# Patient Record
Sex: MALE | Race: WHITE | Employment: FULL TIME | URBAN - METROPOLITAN AREA
[De-identification: names, ages, dates, MRNs, and addresses within clinical notes are randomized per-mention and may not be internally consistent; named-entity substitution may affect disease eponyms.]

---

## 2017-01-04 PROBLEM — E78.5 HYPERLIPIDEMIA, UNSPECIFIED HYPERLIPIDEMIA TYPE: Status: ACTIVE | Noted: 2017-01-04

## 2017-01-16 PROBLEM — M75.122 COMPLETE TEAR OF LEFT ROTATOR CUFF: Status: ACTIVE | Noted: 2017-01-16

## 2017-02-08 PROBLEM — I51.9 SYSTOLIC DYSFUNCTION: Status: ACTIVE | Noted: 2017-02-08

## 2017-02-25 PROCEDURE — 84403 ASSAY OF TOTAL TESTOSTERONE: CPT | Performed by: UROLOGY

## 2017-04-10 PROBLEM — M75.121 COMPLETE TEAR OF RIGHT ROTATOR CUFF: Status: ACTIVE | Noted: 2017-04-10

## 2017-07-06 PROBLEM — S46.891D: Status: ACTIVE | Noted: 2017-07-06

## 2017-07-14 NOTE — H&P
[facility]    PATIENT'S NAME: Euel November   ATTENDING PHYSICIAN: Severiano Andrade M.D.    PATIENT ACCOUNT#:   [de-identified]    LOCATION:  OR   [facility]  MEDICAL RECORD #:   [id number]       YOB: 1960  ADMISSION DATE:       07/18/2017    HIS arthroscopy, acromioplasty, rotator cuff and subscapularis repair as appropriate. Limitations, risks, complications, postoperative scenarios are stressed. He is well counseled. He will go to surgery with informed consent.     Dictated By Juan Standard,

## 2017-07-18 ENCOUNTER — ANESTHESIA (OUTPATIENT)
Dept: SURGERY | Facility: HOSPITAL | Age: 57
End: 2017-07-18

## 2017-07-18 ENCOUNTER — SURGERY (OUTPATIENT)
Age: 57
End: 2017-07-18

## 2017-07-18 ENCOUNTER — ANESTHESIA EVENT (OUTPATIENT)
Dept: SURGERY | Facility: HOSPITAL | Age: 57
End: 2017-07-18

## 2017-07-18 ENCOUNTER — HOSPITAL ENCOUNTER (OUTPATIENT)
Facility: HOSPITAL | Age: 57
Setting detail: HOSPITAL OUTPATIENT SURGERY
Discharge: HOME | End: 2017-07-18
Attending: ORTHOPAEDIC SURGERY | Admitting: ORTHOPAEDIC SURGERY
Payer: COMMERCIAL

## 2017-07-18 VITALS
RESPIRATION RATE: 18 BRPM | OXYGEN SATURATION: 100 % | TEMPERATURE: 98 F | SYSTOLIC BLOOD PRESSURE: 112 MMHG | WEIGHT: 181 LBS | DIASTOLIC BLOOD PRESSURE: 73 MMHG | HEART RATE: 72 BPM | HEIGHT: 74 IN | BODY MASS INDEX: 23.23 KG/M2

## 2017-07-18 PROCEDURE — 0RNJ4ZZ RELEASE RIGHT SHOULDER JOINT, PERCUTANEOUS ENDOSCOPIC APPROACH: ICD-10-PCS | Performed by: ORTHOPAEDIC SURGERY

## 2017-07-18 PROCEDURE — 3E0T3CZ INTRODUCTION OF REGIONAL ANESTHETIC INTO PERIPHERAL NERVES AND PLEXI, PERCUTANEOUS APPROACH: ICD-10-PCS | Performed by: ANESTHESIOLOGY

## 2017-07-18 PROCEDURE — 76942 ECHO GUIDE FOR BIOPSY: CPT | Performed by: ORTHOPAEDIC SURGERY

## 2017-07-18 PROCEDURE — 0LQ14ZZ REPAIR RIGHT SHOULDER TENDON, PERCUTANEOUS ENDOSCOPIC APPROACH: ICD-10-PCS | Performed by: ORTHOPAEDIC SURGERY

## 2017-07-18 DEVICE — SYSTEM ESCP FX 19.1MM 4.75MM: Type: IMPLANTABLE DEVICE | Site: SHOULDER | Status: FUNCTIONAL

## 2017-07-18 RX ORDER — HYDROMORPHONE HYDROCHLORIDE 1 MG/ML
0.4 INJECTION, SOLUTION INTRAMUSCULAR; INTRAVENOUS; SUBCUTANEOUS EVERY 5 MIN PRN
Status: DISCONTINUED | OUTPATIENT
Start: 2017-07-18 | End: 2017-07-18

## 2017-07-18 RX ORDER — METOCLOPRAMIDE HYDROCHLORIDE 5 MG/ML
10 INJECTION INTRAMUSCULAR; INTRAVENOUS AS NEEDED
Status: DISCONTINUED | OUTPATIENT
Start: 2017-07-18 | End: 2017-07-18

## 2017-07-18 RX ORDER — HYDROCODONE BITARTRATE AND ACETAMINOPHEN 5; 325 MG/1; MG/1
1 TABLET ORAL AS NEEDED
Status: DISCONTINUED | OUTPATIENT
Start: 2017-07-18 | End: 2017-07-18

## 2017-07-18 RX ORDER — NALOXONE HYDROCHLORIDE 0.4 MG/ML
80 INJECTION, SOLUTION INTRAMUSCULAR; INTRAVENOUS; SUBCUTANEOUS AS NEEDED
Status: DISCONTINUED | OUTPATIENT
Start: 2017-07-18 | End: 2017-07-18

## 2017-07-18 RX ORDER — SODIUM CHLORIDE, SODIUM LACTATE, POTASSIUM CHLORIDE, CALCIUM CHLORIDE 600; 310; 30; 20 MG/100ML; MG/100ML; MG/100ML; MG/100ML
INJECTION, SOLUTION INTRAVENOUS CONTINUOUS
Status: DISCONTINUED | OUTPATIENT
Start: 2017-07-18 | End: 2017-07-18

## 2017-07-18 RX ORDER — HYDROCODONE BITARTRATE AND ACETAMINOPHEN 5; 325 MG/1; MG/1
2 TABLET ORAL AS NEEDED
Status: DISCONTINUED | OUTPATIENT
Start: 2017-07-18 | End: 2017-07-18

## 2017-07-18 RX ORDER — MEPERIDINE HYDROCHLORIDE 25 MG/ML
12.5 INJECTION INTRAMUSCULAR; INTRAVENOUS; SUBCUTANEOUS AS NEEDED
Status: DISCONTINUED | OUTPATIENT
Start: 2017-07-18 | End: 2017-07-18

## 2017-07-18 RX ORDER — ONDANSETRON 2 MG/ML
4 INJECTION INTRAMUSCULAR; INTRAVENOUS AS NEEDED
Status: DISCONTINUED | OUTPATIENT
Start: 2017-07-18 | End: 2017-07-18

## 2017-07-18 RX ORDER — CEFAZOLIN SODIUM 1 G/3ML
INJECTION, POWDER, FOR SOLUTION INTRAMUSCULAR; INTRAVENOUS
Status: DISCONTINUED | OUTPATIENT
Start: 2017-07-18 | End: 2017-07-18 | Stop reason: HOSPADM

## 2017-07-18 NOTE — INTERVAL H&P NOTE
Pre-op Diagnosis: ROTATOR CUFF TEAR RIGHT SHOULDER    The above referenced H&P was reviewed by Chuy Livingston MD on 7/18/2017, the patient was examined and no significant changes have occurred in the patient's condition since the H&P was performed.   I di

## 2017-07-18 NOTE — ANESTHESIA PREPROCEDURE EVALUATION
PRE-OP EVALUATION    Patient Name: Lora Anguiano    Pre-op Diagnosis: ROTATOR CUFF TEAR RIGHT SHOULDER    Procedure(s):  ARTHROSCOPIC ACROMIOPLASTY ROTATOR CUFF REPAIR AND SUBSCAPULARIS REPAIR RIGHT SHOULDER    Surgeon(s) and Role:     * Hoa Del Rosario, extracardiac: There is no significant pericardial effusion. Impressions:    - Prior study date: 11/29/2016.  - Compared to the prior study, there has been no significant interval    change.   Prepared and signed by  Jose Cali M.D.  02/15/2017     ECG hours.  Post-procedure pain management plan discussed with surgeon and patient.   Surgeon requests: regional block  Comment: The risks and benefits of regional anesthesia have been explained to the patient as indicated on the anesthesia consent form, which

## 2017-07-18 NOTE — BRIEF OP NOTE
Pre-Operative Diagnosis: ROTATOR CUFF TEAR RIGHT SHOULDER     Post-Operative Diagnosis: ROTATOR CUFF TEAR RIGHT SHOULDER     Procedure Performed:   Procedure(s):  ARTHROSCOPIC ACROMIOPLASTY ROTATOR CUFF REPAIR AND SUBSCAPULARIS REPAIR RIGHT SHOULDER

## 2017-07-20 PROBLEM — Z47.89 ORTHOPEDIC AFTERCARE: Status: ACTIVE | Noted: 2017-07-20

## 2017-07-20 NOTE — OPERATIVE REPORT
[facility]    PATIENT'S NAME: Shaye Johnson   ATTENDING PHYSICIAN: Dee Rondon M.D. OPERATING PHYSICIAN: Dee Rondon M.D.    PATIENT ACCOUNT#:   [de-identified]    LOCATION:  [address] [place] [address]  MEDICAL RECORD #:   [id number] shows evidence of tearing at its outlet. It is displaced out of its groove. There is a subscapularis tear. There is a large retracted supraspinatus tear. There is no evidence of significant arthritis. There is diffuse hyperemia.   A lateral portal is e

## 2017-09-11 PROBLEM — Z98.890 S/P ROTATOR CUFF REPAIR: Status: ACTIVE | Noted: 2017-09-11

## 2017-09-28 PROCEDURE — 84403 ASSAY OF TOTAL TESTOSTERONE: CPT | Performed by: UROLOGY

## 2017-12-14 PROBLEM — M75.122 COMPLETE TEAR OF LEFT ROTATOR CUFF: Status: RESOLVED | Noted: 2017-01-16 | Resolved: 2017-12-14

## 2017-12-14 PROBLEM — Z47.89 ORTHOPEDIC AFTERCARE: Status: RESOLVED | Noted: 2017-07-20 | Resolved: 2017-12-14

## 2017-12-14 PROBLEM — S46.891D: Status: RESOLVED | Noted: 2017-07-06 | Resolved: 2017-12-14

## 2017-12-14 PROBLEM — Z28.21 REFUSED H1N1 INFLUENZA VIRUS IMMUNIZATION: Status: ACTIVE | Noted: 2017-12-14

## 2017-12-14 PROBLEM — Z86.0101 HX OF ADENOMATOUS POLYP OF COLON: Status: ACTIVE | Noted: 2017-12-14

## 2017-12-14 PROBLEM — Z86.010 HX OF ADENOMATOUS POLYP OF COLON: Status: ACTIVE | Noted: 2017-12-14

## 2017-12-14 PROBLEM — M75.121 COMPLETE TEAR OF RIGHT ROTATOR CUFF: Status: RESOLVED | Noted: 2017-04-10 | Resolved: 2017-12-14

## 2018-02-08 PROBLEM — I49.3 PVC'S (PREMATURE VENTRICULAR CONTRACTIONS): Status: ACTIVE | Noted: 2018-02-08

## 2018-03-14 PROCEDURE — 84403 ASSAY OF TOTAL TESTOSTERONE: CPT | Performed by: UROLOGY

## 2018-05-17 PROBLEM — M19.012 ARTHROSIS OF LEFT ACROMIOCLAVICULAR JOINT: Status: ACTIVE | Noted: 2017-01-16

## 2018-05-17 RX ORDER — GARLIC EXTRACT 500 MG
1 CAPSULE ORAL AS NEEDED
COMMUNITY

## 2018-05-23 NOTE — H&P
[facility]    PATIENT'S NAME: [facility]   ATTENDING PHYSICIAN: Favian Beard M.D.    PATIENT ACCOUNT#:   [de-identified]    LOCATION:  OR   [facility]  MEDICAL RECORD #:   [id number]       YOB: 1960  ADMISSION DATE:       05/25/2018    HIS joint.  Good capillary refill. No redness, rashes. No palpable lymph nodes. NEUROLOGIC:  He is alert and oriented x3. Cranial nerves II through XII are intact.       PLAN:  The patient is scheduled for a left shoulder arthroscopic acromioplasty, distal

## 2018-05-24 ENCOUNTER — ANESTHESIA EVENT (OUTPATIENT)
Dept: SURGERY | Facility: HOSPITAL | Age: 58
End: 2018-05-24

## 2018-05-25 ENCOUNTER — ANESTHESIA (OUTPATIENT)
Dept: SURGERY | Facility: HOSPITAL | Age: 58
End: 2018-05-25

## 2018-05-25 ENCOUNTER — HOSPITAL ENCOUNTER (OUTPATIENT)
Facility: HOSPITAL | Age: 58
Setting detail: HOSPITAL OUTPATIENT SURGERY
Discharge: HOME | End: 2018-05-25
Attending: ORTHOPAEDIC SURGERY | Admitting: ORTHOPAEDIC SURGERY
Payer: COMMERCIAL

## 2018-05-25 ENCOUNTER — SURGERY (OUTPATIENT)
Age: 58
End: 2018-05-25

## 2018-05-25 VITALS
DIASTOLIC BLOOD PRESSURE: 90 MMHG | SYSTOLIC BLOOD PRESSURE: 137 MMHG | WEIGHT: 191.38 LBS | HEIGHT: 73.5 IN | OXYGEN SATURATION: 100 % | HEART RATE: 64 BPM | RESPIRATION RATE: 18 BRPM | BODY MASS INDEX: 24.82 KG/M2 | TEMPERATURE: 98 F

## 2018-05-25 DIAGNOSIS — M75.122 COMPLETE ROTATOR CUFF TEAR OF LEFT SHOULDER: ICD-10-CM

## 2018-05-25 PROCEDURE — 76942 ECHO GUIDE FOR BIOPSY: CPT | Performed by: ORTHOPAEDIC SURGERY

## 2018-05-25 PROCEDURE — 0PBB4ZZ EXCISION OF LEFT CLAVICLE, PERCUTANEOUS ENDOSCOPIC APPROACH: ICD-10-PCS | Performed by: ORTHOPAEDIC SURGERY

## 2018-05-25 PROCEDURE — 0LQ24ZZ REPAIR LEFT SHOULDER TENDON, PERCUTANEOUS ENDOSCOPIC APPROACH: ICD-10-PCS | Performed by: ORTHOPAEDIC SURGERY

## 2018-05-25 PROCEDURE — 0RNK4ZZ RELEASE LEFT SHOULDER JOINT, PERCUTANEOUS ENDOSCOPIC APPROACH: ICD-10-PCS | Performed by: ORTHOPAEDIC SURGERY

## 2018-05-25 DEVICE — SYSTEM ESCP FX 19.1MM 4.75MM: Type: IMPLANTABLE DEVICE | Site: SHOULDER | Status: FUNCTIONAL

## 2018-05-25 RX ORDER — HYDROCODONE BITARTRATE AND ACETAMINOPHEN 5; 325 MG/1; MG/1
TABLET ORAL
Qty: 60 TABLET | Refills: 0 | Status: SHIPPED | OUTPATIENT
Start: 2018-05-25 | End: 2019-02-08

## 2018-05-25 RX ORDER — CEFAZOLIN SODIUM/WATER 2 G/20 ML
2 SYRINGE (ML) INTRAVENOUS ONCE
Status: COMPLETED | OUTPATIENT
Start: 2018-05-25 | End: 2018-05-25

## 2018-05-25 RX ORDER — METOCLOPRAMIDE HYDROCHLORIDE 5 MG/ML
10 INJECTION INTRAMUSCULAR; INTRAVENOUS AS NEEDED
Status: DISCONTINUED | OUTPATIENT
Start: 2018-05-25 | End: 2018-05-25

## 2018-05-25 RX ORDER — NALOXONE HYDROCHLORIDE 0.4 MG/ML
80 INJECTION, SOLUTION INTRAMUSCULAR; INTRAVENOUS; SUBCUTANEOUS AS NEEDED
Status: DISCONTINUED | OUTPATIENT
Start: 2018-05-25 | End: 2018-05-25

## 2018-05-25 RX ORDER — ACETAMINOPHEN 500 MG
1000 TABLET ORAL AS NEEDED
COMMUNITY

## 2018-05-25 RX ORDER — DIPHENHYDRAMINE HYDROCHLORIDE 50 MG/ML
12.5 INJECTION INTRAMUSCULAR; INTRAVENOUS AS NEEDED
Status: DISCONTINUED | OUTPATIENT
Start: 2018-05-25 | End: 2018-05-25

## 2018-05-25 RX ORDER — BUPIVACAINE HYDROCHLORIDE AND EPINEPHRINE 5; 5 MG/ML; UG/ML
INJECTION, SOLUTION EPIDURAL; INTRACAUDAL; PERINEURAL AS NEEDED
Status: DISCONTINUED | OUTPATIENT
Start: 2018-05-25 | End: 2018-05-25 | Stop reason: HOSPADM

## 2018-05-25 RX ORDER — LABETALOL HYDROCHLORIDE 5 MG/ML
5 INJECTION, SOLUTION INTRAVENOUS EVERY 5 MIN PRN
Status: DISCONTINUED | OUTPATIENT
Start: 2018-05-25 | End: 2018-05-25

## 2018-05-25 RX ORDER — HYDROCODONE BITARTRATE AND ACETAMINOPHEN 5; 325 MG/1; MG/1
2 TABLET ORAL AS NEEDED
Status: DISCONTINUED | OUTPATIENT
Start: 2018-05-25 | End: 2018-05-25

## 2018-05-25 RX ORDER — ONDANSETRON 2 MG/ML
4 INJECTION INTRAMUSCULAR; INTRAVENOUS AS NEEDED
Status: DISCONTINUED | OUTPATIENT
Start: 2018-05-25 | End: 2018-05-25

## 2018-05-25 RX ORDER — SODIUM CHLORIDE, SODIUM LACTATE, POTASSIUM CHLORIDE, CALCIUM CHLORIDE 600; 310; 30; 20 MG/100ML; MG/100ML; MG/100ML; MG/100ML
INJECTION, SOLUTION INTRAVENOUS CONTINUOUS
Status: DISCONTINUED | OUTPATIENT
Start: 2018-05-25 | End: 2018-05-25

## 2018-05-25 RX ORDER — HYDROMORPHONE HYDROCHLORIDE 1 MG/ML
0.4 INJECTION, SOLUTION INTRAMUSCULAR; INTRAVENOUS; SUBCUTANEOUS EVERY 5 MIN PRN
Status: DISCONTINUED | OUTPATIENT
Start: 2018-05-25 | End: 2018-05-25

## 2018-05-25 RX ORDER — HYDROCODONE BITARTRATE AND ACETAMINOPHEN 5; 325 MG/1; MG/1
1 TABLET ORAL AS NEEDED
Status: DISCONTINUED | OUTPATIENT
Start: 2018-05-25 | End: 2018-05-25

## 2018-05-25 RX ORDER — MEPERIDINE HYDROCHLORIDE 25 MG/ML
12.5 INJECTION INTRAMUSCULAR; INTRAVENOUS; SUBCUTANEOUS AS NEEDED
Status: DISCONTINUED | OUTPATIENT
Start: 2018-05-25 | End: 2018-05-25

## 2018-05-25 NOTE — ANESTHESIA POSTPROCEDURE EVALUATION
[address] Patient Status:  Hospital Outpatient Surgery   Age/Gender 60year old male MRN [id number]   [facility] SURGERY Attending Josey Braswell MD   Hosp Day # 0 PCP Emanuel El MD       Anesthesia Post-op Note

## 2018-05-25 NOTE — INTERVAL H&P NOTE
Pre-op Diagnosis: Complete rotator cuff tear of left shoulder [M75.122]    The above referenced H&P was reviewed by Laurie Marquez MD on 5/25/2018, the patient was examined and no significant changes have occurred in the patient's condition since the H&P

## 2018-05-25 NOTE — INTERVAL H&P NOTE
Pre-op Diagnosis: Complete rotator cuff tear of left shoulder [M75.122]    The above referenced H&P was reviewed by Yanna Dickens MD on 5/25/2018, the patient was examined and no significant changes have occurred in the patient's condition since the H&P

## 2018-05-25 NOTE — ANESTHESIA PREPROCEDURE EVALUATION
PRE-OP EVALUATION    Patient Name: Víctor De Anda    Pre-op Diagnosis: Complete rotator cuff tear of left shoulder [M75.122]    Procedure(s):  ARTHROSCOPIC ACROMIOPLASTY ROTATOR CUFF REPAIR RIGHT SHOULDER, DISTAL CLAVICLE RESECTION    Surgeon(s) and Role: tablet Rfl: 0   Acidophilus/Pectin Oral Cap Take 1 capsule by mouth every other day. Disp:  Rfl:    carvedilol 6.25 MG Oral Tab Take 1 tablet (6.25 mg total) by mouth 2 (two) times daily with meals.  Disp: 180 tablet Rfl: 3   lisinopril 2.5 MG Oral Tab Take PND     Endo/Other    Negative endo/other ROS. Pulmonary    Negative pulmonary ROS.           (-) shortness of breath            Neuro/Psych    Negative neuro/psych ROS.                                 Past Surgical History:  10 auto-populate prompts). We discussed GA w/ETT and possible scratchy throat and rarely dental damage. We discussed analgesic plan to include interscalene block and PONV prophylaxis. The patient's questions were answered and consent was attained.     ALL

## 2018-05-25 NOTE — BRIEF OP NOTE
Pre-Operative Diagnosis: Complete rotator cuff tear of left shoulder [M75.122]     Post-Operative Diagnosis: Complete rotator cuff tear of left shoulder [M75.122]      Procedure Performed:   Procedure(s):  ARTHROSCOPIC ACROMIOPLASTY ROTATOR CUFF REPAIR RIG

## 2018-05-25 NOTE — OPERATIVE REPORT
[facility]    PATIENT'S NAME: Perla Cardoso   ATTENDING PHYSICIAN: Jen Kaplan M.D. OPERATING PHYSICIAN: Jen Kaplan M.D.    PATIENT ACCOUNT#:   [de-identified]    LOCATION:  [address] [place] [address]  MEDICAL RECORD #:   [id number] Operative site is identified. Procedure verified. The extremity is initialed by me in preoperative holding. Antibiotics are given. He is placed in the right lateral decubitus position. Axillary roll and beanbag support are used.   The arm is examined u The patient is transferred to the recovery room, awake, in stable condition.     Dictated By Beatriz Charles M.D.  d: 05/25/2018 11:08:34  t: 05/25/2018 15:46:32  [facility] [id number]  MA/

## 2018-06-28 PROBLEM — M25.512 ACUTE PAIN OF LEFT SHOULDER: Status: ACTIVE | Noted: 2018-06-28

## 2018-06-28 PROBLEM — M25.512 LEFT SHOULDER PAIN: Status: ACTIVE | Noted: 2018-06-28

## 2018-07-23 PROBLEM — Z98.890 S/P LEFT ROTATOR CUFF REPAIR: Status: ACTIVE | Noted: 2018-07-23

## 2018-08-03 PROCEDURE — 88305 TISSUE EXAM BY PATHOLOGIST: CPT | Performed by: INTERNAL MEDICINE

## 2019-09-04 PROBLEM — N52.02 CORPORO-VENOUS OCCLUSIVE ERECTILE DYSFUNCTION: Status: ACTIVE | Noted: 2019-09-04

## 2020-03-23 PROBLEM — M25.512 LEFT SHOULDER PAIN: Status: RESOLVED | Noted: 2018-06-28 | Resolved: 2020-03-23

## 2020-03-23 PROBLEM — M25.512 ACUTE PAIN OF LEFT SHOULDER: Status: RESOLVED | Noted: 2018-06-28 | Resolved: 2020-03-23

## 2020-09-10 PROBLEM — Z28.21 REFUSED H1N1 INFLUENZA VIRUS IMMUNIZATION: Status: RESOLVED | Noted: 2017-12-14 | Resolved: 2020-09-10

## 2021-05-26 PROBLEM — B02.9 HERPES ZOSTER WITHOUT COMPLICATION: Status: ACTIVE | Noted: 2021-05-26

## 2024-11-12 ENCOUNTER — LAB ENCOUNTER (OUTPATIENT)
Dept: LAB | Age: 64
End: 2024-11-12
Attending: ORTHOPAEDIC SURGERY
Payer: MEDICARE

## 2024-11-12 DIAGNOSIS — Z01.818 PREOP TESTING: ICD-10-CM

## 2024-11-12 LAB
ANTIBODY SCREEN: NEGATIVE
RH BLOOD TYPE: POSITIVE

## 2024-11-12 PROCEDURE — 36415 COLL VENOUS BLD VENIPUNCTURE: CPT

## 2024-11-12 PROCEDURE — 86900 BLOOD TYPING SEROLOGIC ABO: CPT

## 2024-11-12 PROCEDURE — 86901 BLOOD TYPING SEROLOGIC RH(D): CPT

## 2024-11-12 PROCEDURE — 86850 RBC ANTIBODY SCREEN: CPT
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
## 2024-11-15 ENCOUNTER — APPOINTMENT (OUTPATIENT)
Dept: GENERAL RADIOLOGY | Facility: HOSPITAL | Age: 64
End: 2024-11-15
Attending: ORTHOPAEDIC SURGERY
Payer: MEDICARE

## 2024-11-15 ENCOUNTER — ANESTHESIA (OUTPATIENT)
Dept: SURGERY | Facility: HOSPITAL | Age: 64
End: 2024-11-15
Payer: MEDICARE

## 2024-11-15 ENCOUNTER — APPOINTMENT (OUTPATIENT)
Dept: GENERAL RADIOLOGY | Facility: HOSPITAL | Age: 64
End: 2024-11-15
Attending: PHYSICIAN ASSISTANT
Payer: MEDICARE

## 2024-11-15 ENCOUNTER — HOSPITAL ENCOUNTER (INPATIENT)
Facility: HOSPITAL | Age: 64
LOS: 1 days | Discharge: HOME | End: 2024-11-16
Attending: ORTHOPAEDIC SURGERY | Admitting: ORTHOPAEDIC SURGERY
Payer: MEDICARE

## 2024-11-15 ENCOUNTER — ANESTHESIA EVENT (OUTPATIENT)
Dept: SURGERY | Facility: HOSPITAL | Age: 64
End: 2024-11-15
Payer: MEDICARE

## 2024-11-15 DIAGNOSIS — M16.11 PRIMARY OSTEOARTHRITIS OF RIGHT HIP: ICD-10-CM

## 2024-11-15 DIAGNOSIS — Z01.818 PREOP TESTING: Primary | ICD-10-CM

## 2024-11-15 LAB
DEPRECATED RDW RBC AUTO: 43.4 FL (ref 35.1–46.3)
ERYTHROCYTE [DISTWIDTH] IN BLOOD BY AUTOMATED COUNT: 12.6 % (ref 11–15)
HCT VFR BLD AUTO: 44.8 %
HGB BLD-MCNC: 15.2 G/DL
MCH RBC QN AUTO: 31.8 PG (ref 26–34)
MCHC RBC AUTO-ENTMCNC: 33.9 G/DL (ref 31–37)
MCV RBC AUTO: 93.7 FL
PLATELET # BLD AUTO: 173 10(3)UL (ref 150–450)
RBC # BLD AUTO: 4.78 X10(6)UL
RH BLOOD TYPE: POSITIVE
WBC # BLD AUTO: 7.3 X10(3) UL (ref 4–11)

## 2024-11-15 PROCEDURE — 88305 TISSUE EXAM BY PATHOLOGIST: CPT | Performed by: ORTHOPAEDIC SURGERY

## 2024-11-15 PROCEDURE — 0SR90JA REPLACEMENT OF RIGHT HIP JOINT WITH SYNTHETIC SUBSTITUTE, UNCEMENTED, OPEN APPROACH: ICD-10-PCS | Performed by: ORTHOPAEDIC SURGERY

## 2024-11-15 PROCEDURE — 76000 FLUOROSCOPY <1 HR PHYS/QHP: CPT | Performed by: ORTHOPAEDIC SURGERY

## 2024-11-15 PROCEDURE — 88311 DECALCIFY TISSUE: CPT | Performed by: ORTHOPAEDIC SURGERY

## 2024-11-15 PROCEDURE — 85027 COMPLETE CBC AUTOMATED: CPT | Performed by: PHYSICIAN ASSISTANT

## 2024-11-15 PROCEDURE — 73502 X-RAY EXAM HIP UNI 2-3 VIEWS: CPT | Performed by: PHYSICIAN ASSISTANT

## 2024-11-15 PROCEDURE — BQ101ZZ FLUOROSCOPY OF RIGHT HIP USING LOW OSMOLAR CONTRAST: ICD-10-PCS | Performed by: ORTHOPAEDIC SURGERY

## 2024-11-15 DEVICE — CANCELLOUS BONE SCREW Ø 6.5 L 35
Type: IMPLANTABLE DEVICE | Site: HIP | Status: FUNCTIONAL
Brand: MPACT EXTENSION

## 2024-11-15 DEVICE — HIP REPLACEMENT WITH CERAMIC HEAD: Type: IMPLANTABLE DEVICE | Site: HIP

## 2024-11-15 DEVICE — AMISTEM-P STD STEM #5
Type: IMPLANTABLE DEVICE | Site: HIP | Status: FUNCTIONAL
Brand: AMISTEM-P

## 2024-11-15 DEVICE — ACETABULAR SHELL Ø58 TWO HOLES
Type: IMPLANTABLE DEVICE | Site: HIP | Status: FUNCTIONAL
Brand: MPACT ACETABULAR SYSTEM

## 2024-11-15 DEVICE — FEMORAL HEAD Ø 36 SIZE L
Type: IMPLANTABLE DEVICE | Site: HIP | Status: FUNCTIONAL
Brand: MECTACER BIOLOX DELTA FEMORAL BALL HEAD

## 2024-11-15 DEVICE — FLAT PE  HC LINER Ø 36 / F
Type: IMPLANTABLE DEVICE | Site: HIP | Status: FUNCTIONAL
Brand: MPACT ACETABULAR SYSTEM

## 2024-11-15 RX ORDER — METOCLOPRAMIDE HYDROCHLORIDE 5 MG/ML
10 INJECTION INTRAMUSCULAR; INTRAVENOUS EVERY 8 HOURS PRN
Status: DISCONTINUED | OUTPATIENT
Start: 2024-11-15 | End: 2024-11-15 | Stop reason: HOSPADM

## 2024-11-15 RX ORDER — ACETAMINOPHEN 10 MG/ML
1000 INJECTION, SOLUTION INTRAVENOUS ONCE
Status: DISCONTINUED | OUTPATIENT
Start: 2024-11-15 | End: 2024-11-15 | Stop reason: HOSPADM

## 2024-11-15 RX ORDER — SODIUM CHLORIDE, SODIUM LACTATE, POTASSIUM CHLORIDE, CALCIUM CHLORIDE 600; 310; 30; 20 MG/100ML; MG/100ML; MG/100ML; MG/100ML
INJECTION, SOLUTION INTRAVENOUS CONTINUOUS
Status: DISCONTINUED | OUTPATIENT
Start: 2024-11-15 | End: 2024-11-15 | Stop reason: HOSPADM

## 2024-11-15 RX ORDER — ACETAMINOPHEN 500 MG
1000 TABLET ORAL ONCE
Status: COMPLETED | OUTPATIENT
Start: 2024-11-15 | End: 2024-11-15

## 2024-11-15 RX ORDER — HYDROCODONE BITARTRATE AND ACETAMINOPHEN 7.5; 325 MG/1; MG/1
1 TABLET ORAL EVERY 6 HOURS PRN
Status: DISCONTINUED | OUTPATIENT
Start: 2024-11-15 | End: 2024-11-16

## 2024-11-15 RX ORDER — DIPHENHYDRAMINE HCL 25 MG
25 CAPSULE ORAL EVERY 4 HOURS PRN
Status: DISCONTINUED | OUTPATIENT
Start: 2024-11-15 | End: 2024-11-16

## 2024-11-15 RX ORDER — DOCUSATE SODIUM 100 MG/1
100 CAPSULE, LIQUID FILLED ORAL 2 TIMES DAILY
Status: DISCONTINUED | OUTPATIENT
Start: 2024-11-15 | End: 2024-11-16

## 2024-11-15 RX ORDER — SODIUM PHOSPHATE, DIBASIC AND SODIUM PHOSPHATE, MONOBASIC 7; 19 G/230ML; G/230ML
1 ENEMA RECTAL ONCE AS NEEDED
Status: DISCONTINUED | OUTPATIENT
Start: 2024-11-15 | End: 2024-11-16

## 2024-11-15 RX ORDER — ATORVASTATIN CALCIUM 10 MG/1
10 TABLET, FILM COATED ORAL DAILY
Status: DISCONTINUED | OUTPATIENT
Start: 2024-11-16 | End: 2024-11-16

## 2024-11-15 RX ORDER — IPRATROPIUM BROMIDE 21 UG/1
2 SPRAY, METERED NASAL 2 TIMES DAILY
COMMUNITY
Start: 2024-08-26

## 2024-11-15 RX ORDER — ONDANSETRON 2 MG/ML
4 INJECTION INTRAMUSCULAR; INTRAVENOUS EVERY 6 HOURS PRN
Status: DISCONTINUED | OUTPATIENT
Start: 2024-11-15 | End: 2024-11-15 | Stop reason: HOSPADM

## 2024-11-15 RX ORDER — FERROUS SULFATE 325(65) MG
325 TABLET, DELAYED RELEASE (ENTERIC COATED) ORAL
Status: DISCONTINUED | OUTPATIENT
Start: 2024-11-16 | End: 2024-11-16

## 2024-11-15 RX ORDER — ONDANSETRON 2 MG/ML
4 INJECTION INTRAMUSCULAR; INTRAVENOUS EVERY 6 HOURS PRN
Status: DISCONTINUED | OUTPATIENT
Start: 2024-11-15 | End: 2024-11-16

## 2024-11-15 RX ORDER — EPHEDRINE SULFATE 50 MG/ML
INJECTION INTRAVENOUS AS NEEDED
Status: DISCONTINUED | OUTPATIENT
Start: 2024-11-15 | End: 2024-11-15 | Stop reason: SURG

## 2024-11-15 RX ORDER — BISACODYL 10 MG
10 SUPPOSITORY, RECTAL RECTAL
Status: DISCONTINUED | OUTPATIENT
Start: 2024-11-15 | End: 2024-11-16

## 2024-11-15 RX ORDER — SODIUM CHLORIDE 9 MG/ML
INJECTION, SOLUTION INTRAVENOUS CONTINUOUS
Status: DISCONTINUED | OUTPATIENT
Start: 2024-11-15 | End: 2024-11-16

## 2024-11-15 RX ORDER — PANTOPRAZOLE SODIUM 40 MG/1
40 TABLET, DELAYED RELEASE ORAL
Status: DISCONTINUED | OUTPATIENT
Start: 2024-11-16 | End: 2024-11-16

## 2024-11-15 RX ORDER — ASPIRIN 325 MG
325 TABLET, DELAYED RELEASE (ENTERIC COATED) ORAL 2 TIMES DAILY
Status: DISCONTINUED | OUTPATIENT
Start: 2024-11-15 | End: 2024-11-16

## 2024-11-15 RX ORDER — HYDROMORPHONE HYDROCHLORIDE 1 MG/ML
0.4 INJECTION, SOLUTION INTRAMUSCULAR; INTRAVENOUS; SUBCUTANEOUS EVERY 2 HOUR PRN
Status: DISCONTINUED | OUTPATIENT
Start: 2024-11-15 | End: 2024-11-16

## 2024-11-15 RX ORDER — DIPHENHYDRAMINE HYDROCHLORIDE 50 MG/ML
25 INJECTION INTRAMUSCULAR; INTRAVENOUS ONCE AS NEEDED
Status: ACTIVE | OUTPATIENT
Start: 2024-11-15 | End: 2024-11-15

## 2024-11-15 RX ORDER — TRANEXAMIC ACID 10 MG/ML
INJECTION, SOLUTION INTRAVENOUS AS NEEDED
Status: DISCONTINUED | OUTPATIENT
Start: 2024-11-15 | End: 2024-11-15 | Stop reason: SURG

## 2024-11-15 RX ORDER — BUPIVACAINE HYDROCHLORIDE 7.5 MG/ML
INJECTION, SOLUTION INTRASPINAL
Status: COMPLETED | OUTPATIENT
Start: 2024-11-15 | End: 2024-11-15

## 2024-11-15 RX ORDER — ONDANSETRON 2 MG/ML
INJECTION INTRAMUSCULAR; INTRAVENOUS AS NEEDED
Status: DISCONTINUED | OUTPATIENT
Start: 2024-11-15 | End: 2024-11-15 | Stop reason: SURG

## 2024-11-15 RX ORDER — SODIUM CHLORIDE, SODIUM LACTATE, POTASSIUM CHLORIDE, CALCIUM CHLORIDE 600; 310; 30; 20 MG/100ML; MG/100ML; MG/100ML; MG/100ML
INJECTION, SOLUTION INTRAVENOUS CONTINUOUS
Status: DISCONTINUED | OUTPATIENT
Start: 2024-11-15 | End: 2024-11-16

## 2024-11-15 RX ORDER — POLYETHYLENE GLYCOL 3350 17 G/17G
17 POWDER, FOR SOLUTION ORAL DAILY PRN
Status: DISCONTINUED | OUTPATIENT
Start: 2024-11-15 | End: 2024-11-16

## 2024-11-15 RX ORDER — SENNOSIDES 8.6 MG
17.2 TABLET ORAL NIGHTLY
Status: DISCONTINUED | OUTPATIENT
Start: 2024-11-15 | End: 2024-11-16

## 2024-11-15 RX ORDER — FAMOTIDINE 10 MG/ML
20 INJECTION, SOLUTION INTRAVENOUS 2 TIMES DAILY
Status: DISCONTINUED | OUTPATIENT
Start: 2024-11-15 | End: 2024-11-15

## 2024-11-15 RX ORDER — METOCLOPRAMIDE HYDROCHLORIDE 5 MG/ML
10 INJECTION INTRAMUSCULAR; INTRAVENOUS EVERY 8 HOURS PRN
Status: DISCONTINUED | OUTPATIENT
Start: 2024-11-15 | End: 2024-11-16

## 2024-11-15 RX ORDER — MIDAZOLAM HYDROCHLORIDE 1 MG/ML
INJECTION INTRAMUSCULAR; INTRAVENOUS AS NEEDED
Status: DISCONTINUED | OUTPATIENT
Start: 2024-11-15 | End: 2024-11-15 | Stop reason: SURG

## 2024-11-15 RX ORDER — NALOXONE HYDROCHLORIDE 0.4 MG/ML
0.08 INJECTION, SOLUTION INTRAMUSCULAR; INTRAVENOUS; SUBCUTANEOUS AS NEEDED
Status: DISCONTINUED | OUTPATIENT
Start: 2024-11-15 | End: 2024-11-15 | Stop reason: HOSPADM

## 2024-11-15 RX ORDER — FAMOTIDINE 20 MG/1
20 TABLET, FILM COATED ORAL 2 TIMES DAILY
Status: DISCONTINUED | OUTPATIENT
Start: 2024-11-15 | End: 2024-11-15

## 2024-11-15 RX ORDER — METOPROLOL TARTRATE 25 MG/1
25 TABLET, FILM COATED ORAL ONCE AS NEEDED
Status: DISCONTINUED | OUTPATIENT
Start: 2024-11-15 | End: 2024-11-15 | Stop reason: HOSPADM

## 2024-11-15 RX ORDER — CARVEDILOL 6.25 MG/1
6.25 TABLET ORAL 2 TIMES DAILY WITH MEALS
Status: DISCONTINUED | OUTPATIENT
Start: 2024-11-15 | End: 2024-11-16

## 2024-11-15 RX ORDER — LABETALOL HYDROCHLORIDE 5 MG/ML
5 INJECTION, SOLUTION INTRAVENOUS EVERY 5 MIN PRN
Status: DISCONTINUED | OUTPATIENT
Start: 2024-11-15 | End: 2024-11-15 | Stop reason: HOSPADM

## 2024-11-15 RX ORDER — HYDROMORPHONE HYDROCHLORIDE 1 MG/ML
0.2 INJECTION, SOLUTION INTRAMUSCULAR; INTRAVENOUS; SUBCUTANEOUS EVERY 2 HOUR PRN
Status: DISCONTINUED | OUTPATIENT
Start: 2024-11-15 | End: 2024-11-16

## 2024-11-15 RX ORDER — DIPHENHYDRAMINE HYDROCHLORIDE 50 MG/ML
12.5 INJECTION INTRAMUSCULAR; INTRAVENOUS EVERY 4 HOURS PRN
Status: DISCONTINUED | OUTPATIENT
Start: 2024-11-15 | End: 2024-11-16

## 2024-11-15 RX ADMIN — ONDANSETRON 4 MG: 2 INJECTION INTRAMUSCULAR; INTRAVENOUS at 09:34:00

## 2024-11-15 RX ADMIN — SODIUM CHLORIDE, SODIUM LACTATE, POTASSIUM CHLORIDE, CALCIUM CHLORIDE: 600; 310; 30; 20 INJECTION, SOLUTION INTRAVENOUS at 07:51:00

## 2024-11-15 RX ADMIN — MIDAZOLAM HYDROCHLORIDE 2 MG: 1 INJECTION INTRAMUSCULAR; INTRAVENOUS at 07:34:00

## 2024-11-15 RX ADMIN — EPHEDRINE SULFATE 5 MG: 50 INJECTION INTRAVENOUS at 08:38:00

## 2024-11-15 RX ADMIN — EPHEDRINE SULFATE 5 MG: 50 INJECTION INTRAVENOUS at 08:05:00

## 2024-11-15 RX ADMIN — BUPIVACAINE HYDROCHLORIDE 1.7 ML: 7.5 INJECTION, SOLUTION INTRASPINAL at 07:45:00

## 2024-11-15 RX ADMIN — TRANEXAMIC ACID 1000 MG: 10 INJECTION, SOLUTION INTRAVENOUS at 07:49:00
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
## 2024-11-15 NOTE — OPERATIVE REPORT
OPERATIVE REPORT     PREOPERATIVE DIAGNOSIS:  Osteoarthritis, right hip.  POSTOPERATIVE DIAGNOSIS:  Osteoarthritis, right hip.    PROCEDURE:  right total hip arthroplasty via anterior approach with C-arm control.     ASSISTANT:  Michele Ramon PA-C.       INDICATIONS:  The patient is a 64 year old male with advanced osteoarthritis of the right hip that has not responded to conservative management.  After discussion of the options for treatment, he requested the above procedure.  Our discussion included, but was not limited to, the potential risks of anesthetic complication, infection, DVT or PE, leg length inequality, periprosthetic fracture, injuries to local nerves or blood vessels, bleeding requiring transfusion, periprosthetic fracture, as well as the risk of unanticipated perioperative medical or orthopedic complications.  Written consent was obtained.     OPERATIVE TECHNIQUE:  The patient was brought to the operating room and placed under spinal anesthesia.  Ancef, tranexamic acid, and vancomycin were administered prophylactically.  A time-out was performed.  The right hip and lower extremity were prepped and draped in the usual sterile fashion.  An oblique incision was made just distal and lateral to the ASIS and carried to the fascia over the TFL.  The fascia was divided in line with the skin incision.  The TFL was reflected posteriorly within its sheath, and the rectus was reflected anteriorly.  The anterior circumflex femoral vessels were identified, coagulated, and ligated, and an anterior capsulotomy was performed.  A femoral neck osteotomy was made at the appropriate level and orientation.  The femoral head was removed.  A Charnley retractor was placed within the hip capsule to facilitate exposure, and the acetabular labrum was excised.  The acetabulum was reamed sequentially to 58 mm, at which point good bleeding subchondral bone was obtained.  Depth of reaming as well as inclination and version  were verified with the C-arm.  A Medacta Mpact 2-hole acetabulum was inserted in the appropriate version and inclination and obtained a good press-fit.  A screw was placed in the ilium for supplementary fixation and obtained excellent purchase.  A highly cross-linked flat 36mm ID liner was locked into the shell, and attention was turned to the femur.       The pubofemoral and ischiofemoral ligaments were released, and the femur was externally rotated.  With no traction applied, the leg was extended externally, rotated, and adducted.  Standard retractors were placed around the proximal femur, and a box osteotome was used to open the femoral canal, followed by the canal opening broach.  The femur was then broached up to accept a Medacta AMIS size 5 broach, which obtained excellent press-fit with excellent rotational stability.  The standard trial neck was applied, and a trial reduction was performed with a 36 mm 4mm head.  Check x-rays were taken with the C-arm and overlaid with the preoperative views, verifying appropriate leg length and offset.  The trial components were then removed, and the actual #5 stem was inserted in the femur.  An excellent press-fit was obtained with good rotational stability.  The  ceramic head matching the trial size was applied to a clean dry trunnion, and the hip was reduced.       A final radiograph was taken and verified excellent re-creation of leg length and offset.  The wound was irrigated and closed in routine fashion in layers.  Sponge and instrument counts were correct at the end of the procedure.  Estimated blood loss was 200 mL.  There were no complications.  The routine specimens were sent to Pathology.  The patient was stable under the care of Anesthesia at the completion of the procedure.     TANVIR SINGH MD 11/15/2024

## 2024-11-15 NOTE — INTERVAL H&P NOTE
Pre-op Diagnosis: Osteoarthritis right hip    The above referenced H&P was reviewed by TANVIR SINGH MD on 11/15/2024, the patient was examined and no significant changes have occurred in the patient's condition since the H&P was performed.  I discussed with the patient and/or legal representative the potential benefits, risks and side effects of this procedure; the likelihood of the patient achieving goals; and potential problems that might occur during recuperation.  I discussed reasonable alternatives to the procedure, including risks, benefits and side effects related to the alternatives and risks related to not receiving this procedure.  We will proceed with procedure as planned.

## 2024-11-15 NOTE — ANESTHESIA POSTPROCEDURE EVALUATION
Patient: Brian Carvalho    Procedure Summary       Date: 11/15/24 Room / Location: [facility] MAIN OR  / [facility] MAIN OR    Anesthesia Start: 0734 Anesthesia Stop: 1002    Procedure: Right total hip arthroplasty, anterior approach (Right: Hip) Diagnosis: (Osteoarthritis right hip)    Surgeons: Hector Jernigan MD Anesthesiologist: Shekhar Tinajero MD    Anesthesia Type: MAC, spinal ASA Status: 2            Anesthesia Type: MAC, spinal    Vitals Value Taken Time   BP 90/65 11/15/24 1002   Temp 97.8 11/15/24 1002   Pulse 54 11/15/24 1002   Resp 17 11/15/24 1000   SpO2 100 % 11/15/24 1001   Vitals shown include unfiled device data.    [facility] AN Post Evaluation:   Patient Evaluated in PACU  Patient Participation: complete - patient participated  Level of Consciousness: sleepy but conscious and responsive to verbal stimuli  Pain Score: 1  Pain Management: adequate  Airway Patency:patent  Dental exam unchanged from preop  Yes    Nausea/Vomiting: none  Cardiovascular Status: acceptable, hemodynamically stable and stable  Respiratory Status: acceptable, nasal cannula and spontaneous ventilation  Postoperative Hydration acceptable      Shekhar Tinajero MD  11/15/2024 10:02 AM
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
## 2024-11-15 NOTE — PLAN OF CARE
Problem: Patient Centered Care  Goal: Patient preferences are identified and integrated in the patient's plan of care  Description: Interventions:  - What would you like us to know as we care for you? I have had other surgeries before.  - Provide timely, complete, and accurate information to patient/family  - Incorporate patient and family knowledge, values, beliefs, and cultural backgrounds into the planning and delivery of care  - Encourage patient/family to participate in care and decision-making at the level they choose  - Honor patient and family perspectives and choices  Outcome: Progressing     Problem: Patient/Family Goals  Goal: Patient/Family Long Term Goal  Description: Patient's Long Term Goal: to have pain-free ambulation.    Interventions:  - surgery, physical therapy. Pain management.  - See additional Care Plan goals for specific interventions  Outcome: Progressing  Goal: Patient/Family Short Term Goal  Description: Patient's Short Term Goal: to go home after the hospital stay.    Interventions:   - early ambulation, case management, pain control.  - See additional Care Plan goals for specific interventions  Outcome: Progressing     Problem: PAIN - ADULT  Goal: Verbalizes/displays adequate comfort level or patient's stated pain goal  Description: INTERVENTIONS:  - Encourage pt to monitor pain and request assistance  - Assess pain using appropriate pain scale  - Administer analgesics based on type and severity of pain and evaluate response  - Implement non-pharmacological measures as appropriate and evaluate response  - Consider cultural and social influences on pain and pain management  - Manage/alleviate anxiety  - Utilize distraction and/or relaxation techniques  - Monitor for opioid side effects  - Notify MD/LIP if interventions unsuccessful or patient reports new pain  - Anticipate increased pain with activity and pre-medicate as appropriate  Outcome: Progressing     Problem: RISK FOR INFECTION  - ADULT  Goal: Absence of fever/infection during anticipated neutropenic period  Description: INTERVENTIONS  - Monitor WBC  - Administer growth factors as ordered  - Implement neutropenic guidelines  Outcome: Progressing     Problem: SAFETY ADULT - FALL  Goal: Free from fall injury  Description: INTERVENTIONS:  - Assess pt frequently for physical needs  - Identify cognitive and physical deficits and behaviors that affect risk of falls.  - [place] fall precautions as indicated by assessment.  - Educate pt/family on patient safety including physical limitations  - Instruct pt to call for assistance with activity based on assessment  - Modify environment to reduce risk of injury  - Provide assistive devices as appropriate  - Consider OT/PT consult to assist with strengthening/mobility  - Encourage toileting schedule  Outcome: Progressing     Problem: DISCHARGE PLANNING  Goal: Discharge to home or other facility with appropriate resources  Description: INTERVENTIONS:  - Identify barriers to discharge w/pt and caregiver  - Include patient/family/discharge partner in discharge planning  - Arrange for needed discharge resources and transportation as appropriate  - Identify discharge learning needs (meds, wound care, etc)  - Arrange for interpreters to assist at discharge as needed  - Consider post-discharge preferences of patient/family/discharge partner  - Complete POLST form as appropriate  - Assess patient's ability to be responsible for managing their own health  - Refer to Case Management Department for coordinating discharge planning if the patient needs post-hospital services based on physician/LIP order or complex needs related to functional status, cognitive ability or social support system  Outcome: Progressing   Tolerated oral intake. Minimal pain. Admitted.

## 2024-11-15 NOTE — CM/SW NOTE
Department  notified of request for HHC, aidin referrals started. Assigned CM/SW to follow up with pt/family on further discharge planning.     Arina Nix, DSC

## 2024-11-15 NOTE — DISCHARGE INSTRUCTIONS
Home care nurse to remove wound vac dressing on Friday.  Apply Mepilex dressing.  Change dressing every 3-5 days.  Keep wound dry for 2 weeks.  Ambulate with walker and assist    Dear Patient,     [facility] cares about your progress with recovery following your joint replacement surgery.     300 days from your scheduled surgery, [facility] will send you a follow-up survey to help us understand how your surgery impacted your mobility, pain, and overall quality of life. Please make every effort to complete this survey. The information collected from this survey will be used by your physician to track your recovery.     Sincerely,     [facility]    Sometimes managing your health at home requires assistance.  The [facility] team has recognized your preference to use [facility].  They can be reached by phone at [phone].  The fax number for your reference is [phone].  A representative from the home health agency will contact you or your family to schedule your first visit.

## 2024-11-15 NOTE — BRIEF OP NOTE
Pre-Operative Diagnosis: Osteoarthritis right hip     Post-Operative Diagnosis: Osteoarthritis right hip      Procedure Performed:   Right total hip arthroplasty, anterior approach    Surgeons and Role:     * Tanvir Jernigan MD - Primary    Assistant(s):  Surgical Assistant.: Celina Omalley  PA: Michele Ramon PA-C     Surgical Findings: OA     Specimen: path     Estimated Blood Loss: Blood Output: 200 mL (11/15/2024  9:33 AM)      TANVIR JERNIGAN MD  11/15/2024  9:35 AM

## 2024-11-15 NOTE — ANESTHESIA PROCEDURE NOTES
Spinal Block    Date/Time: 11/15/2024 7:38 AM    Performed by: Shekhar Tinajero MD  Authorized by: Shekhar Tinajero MD      General Information and Staff    Start Time:  11/15/2024 7:38 AM  End Time:  11/15/2024 7:48 AM  Anesthesiologist:  Shekhar Tinajero MD  Performed by:  Anesthesiologist  Site identification: surface landmarks    Reason for Block: at surgeon's request, post-op pain management, procedure for pain and surgical anesthesia    Preanesthetic Checklist: patient identified, IV checked, risks and benefits discussed, monitors and equipment checked, pre-op evaluation, timeout performed, anesthesia consent and sterile technique used      Procedure Details    Patient Position:  Sitting  Prep: Betadine    Monitoring:  Heart rate and continuous pulse ox  Approach:  Midline  Injection Technique:  Single-shot    Needle    Needle Type:  Sprotte  Needle Gauge:  24 G  Needle Length:  4 in    Assessment    Sensory Level:  T6  Events: clear CSF, CSF aspirated, well tolerated and blood negative      Additional Comments     Also added 0.1 mg morphine into the Spinal injection solution

## 2024-11-16 VITALS
RESPIRATION RATE: 16 BRPM | WEIGHT: 190 LBS | SYSTOLIC BLOOD PRESSURE: 110 MMHG | HEART RATE: 80 BPM | BODY MASS INDEX: 24.64 KG/M2 | DIASTOLIC BLOOD PRESSURE: 72 MMHG | TEMPERATURE: 100 F | OXYGEN SATURATION: 95 % | HEIGHT: 73.5 IN

## 2024-11-16 LAB
DEPRECATED RDW RBC AUTO: 42.4 FL (ref 35.1–46.3)
ERYTHROCYTE [DISTWIDTH] IN BLOOD BY AUTOMATED COUNT: 12.6 % (ref 11–15)
HCT VFR BLD AUTO: 39 %
HGB BLD-MCNC: 13.7 G/DL
MCH RBC QN AUTO: 32.6 PG (ref 26–34)
MCHC RBC AUTO-ENTMCNC: 35.1 G/DL (ref 31–37)
MCV RBC AUTO: 92.9 FL
PLATELET # BLD AUTO: 145 10(3)UL (ref 150–450)
RBC # BLD AUTO: 4.2 X10(6)UL
WBC # BLD AUTO: 6.6 X10(3) UL (ref 4–11)

## 2024-11-16 PROCEDURE — 97161 PT EVAL LOW COMPLEX 20 MIN: CPT

## 2024-11-16 PROCEDURE — 97530 THERAPEUTIC ACTIVITIES: CPT

## 2024-11-16 PROCEDURE — 97165 OT EVAL LOW COMPLEX 30 MIN: CPT

## 2024-11-16 PROCEDURE — 85027 COMPLETE CBC AUTOMATED: CPT | Performed by: PHYSICIAN ASSISTANT

## 2024-11-16 RX ORDER — HYDROCODONE BITARTRATE AND ACETAMINOPHEN 7.5; 325 MG/1; MG/1
1 TABLET ORAL EVERY 6 HOURS PRN
Qty: 40 TABLET | Refills: 0 | Status: SHIPPED | OUTPATIENT
Start: 2024-11-16

## 2024-11-16 RX ORDER — ACETAMINOPHEN 500 MG
500 TABLET ORAL EVERY 6 HOURS PRN
Qty: 100 TABLET | Refills: 0 | Status: SHIPPED | OUTPATIENT
Start: 2024-11-16

## 2024-11-16 RX ORDER — ASPIRIN 325 MG
325 TABLET, DELAYED RELEASE (ENTERIC COATED) ORAL 2 TIMES DAILY
Qty: 60 TABLET | Refills: 0 | Status: SHIPPED | OUTPATIENT
Start: 2024-11-16 | End: 2024-12-16

## 2024-11-16 RX ORDER — FERROUS SULFATE 325(65) MG
325 TABLET, DELAYED RELEASE (ENTERIC COATED) ORAL
Qty: 30 TABLET | Refills: 0 | Status: SHIPPED | OUTPATIENT
Start: 2024-11-16

## 2024-11-16 RX ORDER — PSEUDOEPHEDRINE HCL 30 MG
100 TABLET ORAL 2 TIMES DAILY
Qty: 60 CAPSULE | Refills: 0 | Status: SHIPPED | OUTPATIENT
Start: 2024-11-16 | End: 2024-12-16

## 2024-11-16 NOTE — CM/SW NOTE
SW followed up on DC planning.     [facility] referral with [facility] sent    Needs choice    PLAN: home with [facility]    Fernanda MADDEN LSW, MSW ext. [phone]

## 2024-11-16 NOTE — PLAN OF CARE
Patient alert and oriented x4. On room air. VSS. Asa/scd for dvt prophylaxis. Voiding freely via urinal. Pain managed with prn norco. Ambulating stand by with walker. Walker vended for home. Prevena wound vac to right hip Is intact. No output. Cleared for discharge. Instructions provided at bedside. Escorted to private vehicle via wheelchair.      Problem: Patient Centered Care  Goal: Patient preferences are identified and integrated in the patient's plan of care  Description: Interventions:  - Provide timely, complete, and accurate information to patient/family  - Incorporate patient and family knowledge, values, beliefs, and cultural backgrounds into the planning and delivery of care  - Encourage patient/family to participate in care and decision-making at the level they choose  - Honor patient and family perspectives and choices  Outcome: Progressing     Problem: Patient/Family Goals  Goal: Patient/Family Long Term Goal  Description: Patient's Long Term Goal: to have pain-free ambulation.    Interventions:  - surgery, physical therapy. Pain management.  - See additional Care Plan goals for specific interventions  Outcome: Progressing  Goal: Patient/Family Short Term Goal  Description: Patient's Short Term Goal: to go home after the hospital stay.    Interventions:   - early ambulation, case management, pain control.  - See additional Care Plan goals for specific interventions  Outcome: Progressing     Problem: PAIN - ADULT  Goal: Verbalizes/displays adequate comfort level or patient's stated pain goal  Description: INTERVENTIONS:  - Encourage pt to monitor pain and request assistance  - Assess pain using appropriate pain scale  - Administer analgesics based on type and severity of pain and evaluate response  - Implement non-pharmacological measures as appropriate and evaluate response  - Consider cultural and social influences on pain and pain management  - Manage/alleviate anxiety  - Utilize distraction and/or  relaxation techniques  - Monitor for opioid side effects  - Notify MD/LIP if interventions unsuccessful or patient reports new pain  - Anticipate increased pain with activity and pre-medicate as appropriate  Outcome: Progressing     Problem: RISK FOR INFECTION - ADULT  Goal: Absence of fever/infection during anticipated neutropenic period  Description: INTERVENTIONS  - Monitor WBC  - Administer growth factors as ordered  - Implement neutropenic guidelines  Outcome: Progressing     Problem: SAFETY ADULT - FALL  Goal: Free from fall injury  Description: INTERVENTIONS:  - Assess pt frequently for physical needs  - Identify cognitive and physical deficits and behaviors that affect risk of falls.  - [place] fall precautions as indicated by assessment.  - Educate pt/family on patient safety including physical limitations  - Instruct pt to call for assistance with activity based on assessment  - Modify environment to reduce risk of injury  - Provide assistive devices as appropriate  - Consider OT/PT consult to assist with strengthening/mobility  - Encourage toileting schedule  Outcome: Progressing     Problem: DISCHARGE PLANNING  Goal: Discharge to home or other facility with appropriate resources  Description: INTERVENTIONS:  - Identify barriers to discharge w/pt and caregiver  - Include patient/family/discharge partner in discharge planning  - Arrange for needed discharge resources and transportation as appropriate  - Identify discharge learning needs (meds, wound care, etc)  - Arrange for interpreters to assist at discharge as needed  - Consider post-discharge preferences of patient/family/discharge partner  - Complete POLST form as appropriate  - Assess patient's ability to be responsible for managing their own health  - Refer to Case Management Department for coordinating discharge planning if the patient needs post-hospital services based on physician/LIP order or complex needs related to functional status,  cognitive ability or social support system  Outcome: Progressing

## 2024-11-16 NOTE — CM/SW NOTE
MDO placed for discharge.  PT/OT evaluations pending.    CM tentatively reserved surgeon preferred HHA via [facility].  Patient discharge instructions updated with [facility] contact information. CM notified [facility] liaison Joleen of potential discharge today.  [facility] will be reaching out to patient to coordinate start of care.    / to remain available for support and/or discharge planning.     Plan: Home with [facility], pending PT/OT    Deena Aleman RN, BSN  Nurse   [phone]

## 2024-11-16 NOTE — OCCUPATIONAL THERAPY NOTE
OCCUPATIONAL THERAPY EVALUATION - INPATIENT     Room Number: 402/402-A  Evaluation Date: 11/16/2024  Type of Evaluation: Quick Eval  Presenting Problem: s/p R DAI (anterior - no prec) by Dr. Jernigan 11/15    Physician Order: IP Consult to Occupational Therapy  Reason for Therapy: ADL/IADL Dysfunction and Discharge Planning    OCCUPATIONAL THERAPY ASSESSMENT   Patient is a 64 year old male admitted 11/15/2024 for s/p R DAI (anterior - no prec) by Dr. Jernigan 11/15.  Prior to admission, patient's baseline is independent with I/ADLs, including driving, and fx mobility/transfers without a device.  Patient is currently functioning near baseline with  ADLs and fx mobility/transfers . Anticipate patient will be able to safely discharge home with additional support when medically cleared.    PLAN  Patient has been evaluated and presents with no skilled Occupational Therapy needs  at this time.  Patient will be discharged from Occupational Therapy services. Please re-order if a new functional limitation presents during this admission.    OT Device Recommendations: Shower chair    OCCUPATIONAL THERAPY MEDICAL/SOCIAL HISTORY   Problem List  Active Problems:    Primary osteoarthritis of right hip    HOME SITUATION  Type of Home: House  Home Layout: Multi-level; Bed/bath upstairs  Lives With: Spouse  Toilet and Equipment: Comfort height toilet  Shower/Tub and Equipment: Walk-in shower  Drives: Yes  Patient Regularly Uses: Glasses  Stairs in Home: 1 KEIRA, 6 steps upstairs with railing  Use of Assistive Device(s): None     SUBJECTIVE  Patient agreeable to OT evaluation.    OCCUPATIONAL THERAPY EXAMINATION    OBJECTIVE  Precautions: None  Fall Risk: Standard fall risk    WEIGHT BEARING RESTRICTION  R Lower Extremity: Weight Bearing as Tolerated    PAIN ASSESSMENT  Rating: -- (not rated)  Location: R hip  Management Techniques: Activity promotion; Body mechanics; Repositioning; Ice      COGNITION  Overall Cognitive Status:  WFL -  within functional limits    SENSATION  Light touch:  intact    RANGE OF MOTION   Upper extremity ROM is within functional limits     STRENGTH ASSESSMENT  Upper extremity strength is within functional limits     COORDINATION  Gross Motor: WFL   Fine Motor: WFL     ACTIVITIES OF DAILY LIVING ASSESSMENT  AM-PAC ‘6-Clicks’ Inpatient Daily Activity Short Form  How much help from another person does the patient currently need…  -   Putting on and taking off regular lower body clothing?: A Little  -   Bathing (including washing, rinsing, drying)?: A Little  -   Toileting, which includes using toilet, bedpan or urinal? : A Little  -   Putting on and taking off regular upper body clothing?: None  -   Taking care of personal grooming such as brushing teeth?: None  -   Eating meals?: None    AM-PAC Score:  Score: 21  Approx Degree of Impairment: 32.79%  Standardized Score (AM-PAC Scale): 44.27  CMS Modifier (G-Code): CJ    BED MOBILITY  Supine to Sit: independent    FUNCTIONAL TRANSFER ASSESSMENT  Sit to Stand from EOB: independent  Sit to Stand from Chair: independent  Chair Transfer: independent  Comments:  Benefited from verbal cues for extending surgical LE anteriorly during transfers for pain management    FUNCTIONAL MOBILITY  supervision for [place] fx mobility using RW    ACTIVITIES OF DAILY LIVING  Eating: independent (per obs)   Grooming: supervision standing at sink (per obs)   UB Dressing: independent   LB Dressing: supervision  Toileting: supervision (per obs)     EDUCATION PROVIDED  Patient Education : Discharge Recommendations; Plan of Care; Role of Occupational Therapy; Functional Transfer Techniques; DME Recommendations; Fall Prevention; Weight Bear Status; Posture/Positioning; Proper Body Mechanics; Energy Conservation  Patient's Response to Education: Returned Demonstration; Verbalized Understanding    The patient's Approx Degree of Impairment: 32.79% has been calculated based on documentation in the [facility] '6  clicks' Inpatient Daily Activity Short Form.  Research supports that patients with this level of impairment may benefit from discharge home without skilled OT needs.  Final disposition will be made by interdisciplinary medical team.    Patient End of Session: Up in chair;Needs met;RN aware of session/findings;Call light within reach;All patient questions and concerns addressed;Hospital anti-slip socks;Ice applied    Patient was able to achieve the following ...   Patient able to toilet transfer   SUP based on similar fx t/f    Patient able to dress lower extremities   SUP    Patient/Caregiver able to demonstrate safety with ADLS  at previous functional level     Patient Evaluation Complexity Level:   Occupational Profile/Medical History LOW - Brief history including review of medical or therapy records    Specific performance deficits impacting engagement in ADL/IADL LOW  1 - 3 performance deficits    Client Assessment/Performance Deficits MODERATE - Comorbidities and min to mod modifications of tasks    Clinical Decision Making LOW - Analysis of occupational profile, problem-focused assessments, limited treatment options    Overall Complexity LOW     OT Session Time  Self-Care Home Management: 5 minutes  Therapeutic Activity: 10 minutes    ELIZ Harp/L  [facility]  #[id number]

## 2024-11-16 NOTE — PROGRESS NOTES
Orthopaedic Surgery Inpatient Progress Note  _______________________________________________________________________________________________________________  _______________________________________________________________________________________________________________    Brian Carvalho Patient Status:  Inpatient    1960 MRN [id number]   Location [facility] 4W/SW/SE Attending Hector Jernigan, *     DATE: 2024     HISTORY OF PRESENT ILLNESS: Brian Carvalho is a 64 year old male who presented to the hospital for R hip OA. He underwent R DAI on 11/15.     S/24H EVENTS: Pain overall well controlled overnight. Denies any overnight issues. Tolerating po fluids. Denies fevers, chills, chest pain, dyspnea. Has not yet mobilized well.     PHYSICAL EXAM  /63 (BP Location: Right arm)   Pulse 96   Temp (!) 100.8 °F (38.2 °C) (Oral)   Resp 16   Ht 6' 1.5\" (1.867 m)   Wt 190 lb (86.2 kg)   SpO2 93%   BMI 24.73 kg/m²      Constitutional: The patient is well-developed, well-nourished, in no acute distress.  Neurological: Alert and oriented to person, place, and time.  Psychiatric: Mood and affect normal.  Head: Normocephalic and atraumatic.  Cardiovascular: regular rate by palpation  Pulmonary/Chest: Effort normal. No respiratory distress. Breathing non-labored  Abdominal: Abdomen exhibits no distension.   Right  Leg  INSPECTION/PALPATION  Dressings c/d/i  Moderate  swelling about thigh  ROM  0-80    MOTOR  Intact to TA/GSC/peroneals  SILT Dheeraj/Saph/SPN/DPN/T  2+ DP/PT pulse, brisk capillary refill     LAB RESULTS  Lab Results   Component Value Date    WBC 6.6 2024    HGB 13.7 2024    HCT 39.0 2024    .0 (L) 2024    CREATSERUM 0.90 2021    BUN 17.0 2021     2021    K 4.61 2021     2021    CO2 29.5 (H) 2021     2021    CA 9.2 2021    ALB 4.3 2021    ALKPHO 63 2021    BILT 0.35  08/19/2021    TP 6.5 08/19/2021    AST 18 08/19/2021    ALT 20 08/19/2021    T4F 0.81 (L) 08/17/2020    TSH 2.180 08/17/2020    LIP 28 08/19/2021    PSA 0.604 03/24/2022    DDIMER <0.19 11/15/2016    ESRML 3 12/08/2016    CRP 0.22 12/08/2016    TROP <0.017 11/15/2016    CK 81 10/28/2021       IMAGING  I independently viewed and interpreted the imaging. Radiologist interpretation is available in the imaging report.  X-ray: Plain films R hip demonstrate postop changes c/w R DAI w/ no e/o complication    XR FLUOROSCOPY C-ARM TIME LESS THAN 1 HOUR (CPT=76000)    Result Date: 11/15/2024  CONCLUSION: See above.   Dictated by (CST): Jesus Sutton MD on 11/15/2024 at 2:20 PM     Finalized by (CST): Jesus Sutton MD on 11/15/2024 at 2:21 PM          XR HIP W OR WO PELVIS 2 OR 3 VIEWS, RIGHT (CPT=73502)    Result Date: 11/15/2024  CONCLUSION:  1. No acute fracture or dislocation. 2. Recent postop changes following right hip arthroplasty.  Prosthetic components in anatomic position.  No adverse features. 3. Mild left hip osteoarthritis.  Dom go    Dictated by (CST): Sukumar Phillips MD on 11/15/2024 at 10:41 AM     Finalized by (CST): Sukumar Phillips MD on 11/15/2024 at 10:44 AM               ASSESSMENT/PLAN: Brian Carvalho is a 64 year old male who presents as a patient to the Orthopaedic surgery service for R DAI now POD1 s/p R DAI. He is doing well    Discussed the history, physical exam, treatment to date, and reviewed relevant imaging an studies with the patient.  FURTHER SURGICAL PLANS: None  PAIN: Continue to wean/titrate to appropriate oral regimen.   DIET: ADAT  DVT PPX: ASA, SCDs, ambulation  DISPO: To home today pending pain, PT  MOBILITY: OOBTC  WEIGHT BEARING STATUS: Weightbearing as tolerated  RANGE-OF-MOTION LIMITATIONS: as tolerated  IMAGING ORDERED: None  CONSULTS PLACED: Appreciate PT/OT recs    I have personally seen Brian Carvalho , performed an examination, reviewed all pertinent  imaging/laboratory findings, and discussed in detail their plan of care. Please note that this note was written in combination with voice recognition/dictation software and there is a possibility of transcription errors which were not identified at the time of note submission. If clarification is necessary, please contact the author or clinic staff.    Jimmy Esposito MD  Orthopaedic Surgery  11/16/2024

## 2024-11-16 NOTE — HOME CARE LIAISON
[facility] Spoke w/ patients wife who is agreeable with [facility]. Contact information placed on AVS.

## 2024-11-16 NOTE — PHYSICAL THERAPY NOTE
PHYSICAL THERAPY EVALUATION - INPATIENT     Room Number: 402/402-A  Evaluation Date: 11/16/2024  Type of Evaluation: Initial   Physician Order: PT Eval and Treat    Presenting Problem: s/p R DAI, anterior approach on 11/15     Reason for Therapy: Mobility Dysfunction and Discharge Planning    PHYSICAL THERAPY ASSESSMENT   Patient is a 64 year old male admitted 11/15/2024 for R DAI, anterior approach.  Prior to admission, patient's baseline is independent with ADLs and functional mobility without assistive device.  Patient is currently functioning below baseline with bed mobility, transfers, gait, stair negotiation, standing prolonged periods, and performing household tasks however demonstrated all functional mobility at SBA level and stair navigation with CGA, and is safe to return home with  PT and additional support.     PLAN  Patient has been evaluated and presents with no skilled Physical Therapy needs at this time.  Patient will be discharged from Physical Therapy services. Please re-order if a new functional limitation presents during this admission.    PT Device Recommendation: Rolling walker    PHYSICAL THERAPY MEDICAL/SOCIAL HISTORY     Problem List  Active Problems:    Primary osteoarthritis of right hip    HOME SITUATION  Type of Home: House  Home Layout: Multi-level;Bed/bath upstairs  Stairs to Bedroom: 6    Railing: Yes    Lives With: Spouse    Drives: Yes   Patient Regularly Uses: Glasses      Prior Level of [place]: independent     SUBJECTIVE  \"I was just going to go for a walk in the hallway\"     PHYSICAL THERAPY EXAMINATION   OBJECTIVE  Precautions: None  Fall Risk: Standard fall risk    WEIGHT BEARING RESTRICTION  R Lower Extremity: Weight Bearing as Tolerated    PAIN ASSESSMENT  Rating: 3  Location: R hip  Management Techniques: Activity promotion;Body mechanics;Breathing techniques;Repositioning (ice)    COGNITION  Overall Cognitive Status:  WFL - within functional limits    RANGE OF  MOTION AND STRENGTH ASSESSMENT  Upper extremity ROM and strength are within functional limits.  Lower extremity ROM is within functional limits.  Lower extremity strength is within functional limits.    BALANCE  Static Sitting: Good  Dynamic Sitting: Fair +  Static Standing: Fair  Dynamic Standing: Fair -    AM-PAC '6-Clicks' INPATIENT SHORT FORM - BASIC MOBILITY  How much difficulty does the patient currently have...  Patient Difficulty: Turning over in bed (including adjusting bedclothes, sheets and blankets)?: A Little   Patient Difficulty: Sitting down on and standing up from a chair with arms (e.g., wheelchair, bedside commode, etc.): A Little   Patient Difficulty: Moving from lying on back to sitting on the side of the bed?: A Little   How much help from another person does the patient currently need...   Help from Another: Moving to and from a bed to a chair (including a wheelchair)?: A Little   Help from Another: Need to walk in hospital room?: A Little   Help from Another: Climbing 3-5 steps with a railing?: A Little     AM-PAC Score:  Raw Score: 18   Approx Degree of Impairment: 46.58%   Standardized Score (AM-PAC Scale): 43.63   CMS Modifier (G-Code): CK    FUNCTIONAL ABILITY STATUS  Functional Mobility/Gait Assessment  Gait Assistance: Other (Comment) (SBA)  Distance (ft): 200  Assistive Device: Rolling walker  Pattern: Shuffle;R Decreased stance time (slow, guarded and flexed posture with VC to correct)  Stairs: Stairs  How Many Stairs: 7  Device: 1 Rail  Assist: Contact guard assist  Pattern: Ascend and Descend  Ascend and Descend : Step to  Stand to sit: SBA    Exercise/Education Provided:  Education Provided To: Patient     Patient Education: Role of Physical Therapy;Plan of Care;Discharge Recommendations;DME Recommendations;Functional Transfer Techniques;Weight Bear Status;Posture/Positioning;Edema Reduction;Energy Conservation;Proper Body Mechanics;LE HEP for Strengthening;Gait Training;Stair  Training (benefits of frequent ambulation/oob mobility)     Patient's Response to Education: Verbalized Understanding;Returned Demonstration     Skilled Therapy Provided: Pt received standing in room independently, attempting to go for walk despite line management. Introduced self and role. Educated on the above. Pt slightly impulsive and requires cues to slow down and follow safety cues for line management. Pt was able to ambulate in hallway with RW and SBA; slow but steady gait, VC given for upright posture. No LOB. Navigated 7 stairs with L side HR going up to simulate home environment; VC given for safe sequencing of step-to pattern and he was able to complete with CGA. No LOB. Returned to room and was left sitting in chair with ice applied to right hip, all questions and concerns addressed, needs within reach, handoff to RN complete.     The patient's Approx Degree of Impairment: 46.58% has been calculated based on documentation in the [facility] '6 clicks' Inpatient Basic Mobility Short Form.  Research supports that patients with this level of impairment may benefit from home with HH PT.  Final disposition will be made by interdisciplinary medical team.    Patient End of Session: Up in chair;Needs met;Call light within reach;RN aware of session/findings;All patient questions and concerns addressed;Hospital anti-slip socks;Ice applied    Patient was able to achieve the following ...   Patient able to transfer  At previous, functional level  Safely and independently    Patient able to ambulate on level surfaces   Safely and with SBA     Patient Evaluation Complexity Level:  History Low - no personal factors and/or co-morbidities   Examination of body systems Low -  addressing 1-2 elements   Clinical Presentation Low- Stable   Clinical Decision Making  Low Complexity     Therapeutic activity: 20 minutes

## 2024-11-16 NOTE — PLAN OF CARE
Alert and oriented x4. Admitted for right total hip. POD 2. 1 assist walker. Dent to be removed in morning. Pain management. Plan for home once medically cleared.  Problem: Patient Centered Care  Goal: Patient preferences are identified and integrated in the patient's plan of care  Description: Interventions:  - What would you like us to know as we care for you? Home with spouse   - Provide timely, complete, and accurate information to patient/family  - Incorporate patient and family knowledge, values, beliefs, and cultural backgrounds into the planning and delivery of care  - Encourage patient/family to participate in care and decision-making at the level they choose  - Honor patient and family perspectives and choices  Outcome: Progressing     Problem: Patient/Family Goals  Goal: Patient/Family Long Term Goal  Description: Patient's Long Term Goal: to have pain-free ambulation.    Interventions:  - surgery, physical therapy. Pain management.  - See additional Care Plan goals for specific interventions  Outcome: Progressing  Goal: Patient/Family Short Term Goal  Description: Patient's Short Term Goal: to go home after the hospital stay.    Interventions:   - early ambulation, case management, pain control.  - See additional Care Plan goals for specific interventions  Outcome: Progressing     Problem: PAIN - ADULT  Goal: Verbalizes/displays adequate comfort level or patient's stated pain goal  Description: INTERVENTIONS:  - Encourage pt to monitor pain and request assistance  - Assess pain using appropriate pain scale  - Administer analgesics based on type and severity of pain and evaluate response  - Implement non-pharmacological measures as appropriate and evaluate response  - Consider cultural and social influences on pain and pain management  - Manage/alleviate anxiety  - Utilize distraction and/or relaxation techniques  - Monitor for opioid side effects  - Notify MD/LIP if interventions unsuccessful or patient  reports new pain  - Anticipate increased pain with activity and pre-medicate as appropriate  Outcome: Progressing     Problem: RISK FOR INFECTION - ADULT  Goal: Absence of fever/infection during anticipated neutropenic period  Description: INTERVENTIONS  - Monitor WBC  - Administer growth factors as ordered  - Implement neutropenic guidelines  Outcome: Progressing     Problem: SAFETY ADULT - FALL  Goal: Free from fall injury  Description: INTERVENTIONS:  - Assess pt frequently for physical needs  - Identify cognitive and physical deficits and behaviors that affect risk of falls.  - [place] fall precautions as indicated by assessment.  - Educate pt/family on patient safety including physical limitations  - Instruct pt to call for assistance with activity based on assessment  - Modify environment to reduce risk of injury  - Provide assistive devices as appropriate  - Consider OT/PT consult to assist with strengthening/mobility  - Encourage toileting schedule  Outcome: Progressing     Problem: DISCHARGE PLANNING  Goal: Discharge to home or other facility with appropriate resources  Description: INTERVENTIONS:  - Identify barriers to discharge w/pt and caregiver  - Include patient/family/discharge partner in discharge planning  - Arrange for needed discharge resources and transportation as appropriate  - Identify discharge learning needs (meds, wound care, etc)  - Arrange for interpreters to assist at discharge as needed  - Consider post-discharge preferences of patient/family/discharge partner  - Complete POLST form as appropriate  - Assess patient's ability to be responsible for managing their own health  - Refer to Case Management Department for coordinating discharge planning if the patient needs post-hospital services based on physician/LIP order or complex needs related to functional status, cognitive ability or social support system  Outcome: Progressing

## 2024-11-16 NOTE — DISCHARGE SUMMARY
[facility]   INTERNAL MEDICINE DISCHARGE SUMMARY    Name:     Brian Carvalho  YOB: 1960  MRN:     [id number]    Admission Date:     11/15/2024 Discharge Date:     11/16/24      REASON FOR ADMISSION  Post-op DAI    Primary care physician: Warren Brown MD   Discharging physician: Reuben Trent MD     HOSPITAL COURSE  DISCHARGE DIAGNOSES  PROCEDURES   Mr. Carvalho is a 64 year old male with PMH sig for, HLD, BPH, hs of systolic CHF with improvement of EF, ESTEFANI, who presents with right DAI. Post-op course progressing as expected. Pain controlled w PO meds. Tolerating oral intake. Ambulating w/o issue. Plan for DC home w surgery f/u 1-2 wks, PCP f/u PRN.     Right DAI  - oral and IV meds for pain control wean to oral meds as able  - Adv diet, zofran for nausea, OBR  - PT/OT/SW  - monitor for acute blood loss anemia, cbc in am  - aspirin  DVT prophy per ortho  - further management per surgery     HLD  - continue statin     Hs of systolic CHF  - LV function now back to normal  - continue coreg  - resume ace on DC      Fever overnight - post-op.  No c/f infection      CONSULTATIONS   IP CONSULT TO CASE MANAGEMENT  IP CONSULT TO HOSPITALIST  IP CONSULT TO RESPIRATORY CARE  IP CONSULT TO SOCIAL WORK    IMPORTANT FOLLOW UP  Hector Jernigan MD  [address]  [address]  [facility] [address] [phone]    Follow up in 3 week(s)       Discharge Instructions         Home care nurse to remove wound vac dressing on Friday.  Apply Mepilex dressing.  Change dressing every 3-5 days.  Keep wound dry for 2 weeks.  Ambulate with walker and assist    Dear Patient,     [facility] cares about your progress with recovery following your joint replacement surgery.     300 days from your scheduled surgery, [facility] will send you a follow-up survey to help us understand how your surgery impacted your mobility, pain, and overall quality of life. Please make every effort to complete  this survey. The information collected from this survey will be used by your physician to track your recovery.     Sincerely,     [facility]    Sometimes managing your health at home requires assistance.  [facility] team has recognized your preference to use [facility].  They can be reached by phone at [phone].  The fax number for your reference is [phone].  A representative from the home health agency will contact you or your family to schedule your first visit.           MEDICATIONS AT DISCHARGE     Medication List        START taking these medications      aspirin 325 MG Tbec  Take 1 tablet (325 mg total) by mouth in the morning and 1 tablet (325 mg total) before bedtime.     docusate sodium 100 MG Caps  Commonly known as: COLACE  Take 100 mg by mouth 2 (two) times daily.     ferrous sulfate 325 (65 FE) MG Tbec  Take 1 tablet (325 mg total) by mouth daily with breakfast.     HYDROcodone-acetaminophen 7.5-325 MG Tabs  Commonly known as: Norco  Take 1 tablet by mouth every 6 (six) hours as needed.     Sennosides 17.2 MG Tabs  Take 1 tablet (17.2 mg total) by mouth nightly.            CHANGE how you take these medications      acetaminophen 500 MG Tabs  Commonly known as: Tylenol Extra Strength  Take 1 tablet (500 mg total) by mouth every 6 (six) hours as needed.  What changed:   how much to take  when to take this            CONTINUE taking these medications      atorvastatin 10 MG Tabs  Commonly known as: Lipitor  Take 1 tablet (10 mg total) by mouth daily.     BD Luer-Deven Syringe 18G X 1-1/2\" 3 ML Misc  Generic drug: Syringe/Needle (Disp)  USE TO DRAW UP TESTOSTERONE EVERY 2 WEEKS     carvedilol 6.25 MG Tabs  Commonly known as: Coreg  Take 1 tablet (6.25 mg total) by mouth 2 (two) times daily with meals.     ipratropium 0.03 % Soln  Commonly known as: Atrovent     lisinopril 2.5 MG Tabs  Commonly known as: Prinivil; Zestril  TAKE 1  TABLET(2.5 MG) BY MOUTH DAILY     Needle (Disp) 21G X 1-1/2\" Misc  To use to injection testosterone every 2 weeks     omeprazole 20 MG Cpdr  Commonly known as: PriLOSEC     * Tadalafil 20 MG Tabs  Take 1 tablet (20 mg total) by mouth daily as needed for Erectile Dysfunction.     * Tadalafil 20 MG Tabs  Commonly known as: Cialis  Take 1 tablet (20 mg total) by mouth as needed for Erectile Dysfunction.     testosterone cypionate 200 mg/mL Soln  Commonly known as: Depo-Testosterone  Inject 1 mL (200 mg total) into the muscle every 14 (fourteen) days.           * This list has 2 medication(s) that are the same as other medications prescribed for you. Read the directions carefully, and ask your doctor or other care provider to review them with you.                   Where to Get Your Medications        These medications were sent to [id number] - [place], [place] - [id number] AT [facility], [phone], [phone] [id number], [facility] [address]      Phone: [phone]   acetaminophen 500 MG Tabs  aspirin 325 MG Tbec  docusate sodium 100 MG Caps  ferrous sulfate 325 (65 FE) MG Tbec  HYDROcodone-acetaminophen 7.5-325 MG Tabs  Sennosides 17.2 MG Tabs     PHYSICAL EXAMINATION  Vitals: /72 (BP Location: Right arm)   Pulse 80   Temp 99.6 °F (37.6 °C) (Oral)   Resp 16   Ht 6' 1.5\" (1.867 m)   Wt 190 lb (86.2 kg)   SpO2 95%   BMI 24.73 kg/m²   Gen: A&Ox3, NAD  Eyes: PERRL, normal conjunctivae  HENT: Normocephalic, trachea midline, moist mucous membranes  CV: RRR, no peripheral edema  Resp: CTAB, non-labored respirations, symmetric expansion  GI: Soft, NT, ND, no guarding  MSK:  No C/C/E, normal active/passive ROM in C-spine, R hip w wound vac  Skin: No rashes  Neuro: CN 2-12 grossly intact     DISCHARGE CONDITION  Stable    DISCHARGE LOCATION  Home    Total time coordinating care: 34 minutes. (Hx, exam, chart review, discussing plan w/ patient/family,  nursing/consultants). 50% of the time F2F for counseling, patient education, answering questions & coordination of care.    Patient's prior to admission medications were reviewed, all current medications reviewed. Patient's current and discharge medications have been reconciled and reviewed verbally with the patient and or family members.      Reuben Trent MD   Internal Medicine - [facility]      Objective:    LABORATORY VALUES   No results for input(s): \"NA\", \"K\", \"CL\", \"CO2\", \"BUN\", \"CREATSERUM\", \"ANIONGAP\", \"GLU\", \"CA\", \"MAGNESIUM\", \"PHOS\", \"TP\", \"ALB\", \"AST\", \"ALT\", \"ALKPHO\", \"BILT\", \"EGFRCR\" in the last 168 hours.    Invalid input(s): \"AGR\" Recent Labs   Lab 11/15/24  1115 11/16/24  0403   WBC 7.3 6.6   HGB 15.2 13.7   HCT 44.8 39.0   .0 145.0*   MCV 93.7 92.9      IMAGING, REPORTS, AND DIAGNOSTICS  XR FLUOROSCOPY C-ARM TIME LESS THAN 1 HOUR (CPT=76000)    Result Date: 11/15/2024  CONCLUSION: See above.   Dictated by (CST): Jesus Sutton MD on 11/15/2024 at 2:20 PM     Finalized by (CST): Jesus Sutton MD on 11/15/2024 at 2:21 PM          XR HIP W OR WO PELVIS 2 OR 3 VIEWS, RIGHT (CPT=73502)    Result Date: 11/15/2024  CONCLUSION:  1. No acute fracture or dislocation. 2. Recent postop changes following right hip arthroplasty.  Prosthetic components in anatomic position.  No adverse features. 3. Mild left hip osteoarthritis.  Dom go    Dictated by (CST): Sukumar Phillips MD on 11/15/2024 at 10:41 AM     Finalized by (CST): Sukumar Phillips MD on 11/15/2024 at 10:44 AM

## 2024-11-18 ENCOUNTER — TELEPHONE (OUTPATIENT)
Dept: ORTHOPEDICS CLINIC | Facility: CLINIC | Age: 64
End: 2024-11-18

## 2024-11-18 NOTE — TELEPHONE ENCOUNTER
Spoke with pharmacist. Patient already picked up all medication except for sennosides, which they purchased over the counter as it was not covered.

## 2024-11-18 NOTE — TELEPHONE ENCOUNTER
HYDROcodone-acetaminophen 7.5-325 MG Oral Tab, Take 1 tablet by mouth every 6 (six) hours as needed., Disp: 40 tablet, Rfl: 0    sennosides 17.2 MG Oral Tab, Take 1 tablet (17.2 mg total) by mouth nightly., Disp: 30 tablet, Rfl: 0    Message:  \" Drug not covered by Patient plan. The preferred alternative is ACETAMINOPHENCODENE, HYDROCODONEIBUPROFEN. Please call/fax the pharmacy to change medication along with strength, directions, quantity, and refills.\"

## (undated) DEVICE — NEEDLE SCORPION AR-13995N

## (undated) DEVICE — SPONGE 4X4 10PK

## (undated) DEVICE — SOLUTION IRRIG 1000ML 0.9% NACL USP BTL

## (undated) DEVICE — 10K/24K ARTHROSCOPY INFLOW TUBE SET: Brand: 10K/24K

## (undated) DEVICE — TRAY CATH FOLEY 16FR INCLUDE BARDX IC COMPLT CARE

## (undated) DEVICE — KENDALL SCD EXPRESS SLEEVES, KNEE LENGTH, MEDIUM: Brand: KENDALL SCD

## (undated) DEVICE — GAMMEX® NON-LATEX PI ORTHO SIZE 8.5, STERILE POLYISOPRENE POWDER-FREE SURGICAL GLOVE: Brand: GAMMEX

## (undated) DEVICE — PAD POLAR CARE KNEE/SHOULDER

## (undated) DEVICE — HANDLE SUR BLU PLAS LT FLX SLIP ON ST DISP

## (undated) DEVICE — PACK CDS ANTERIOR HIP

## (undated) DEVICE — SUTURE FIBERTAPE #2 AR-7237

## (undated) DEVICE — GAMMEX® PI HYBRID SIZE 8.5, STERILE POWDER-FREE SURGICAL GLOVE, POLYISOPRENE AND NEOPRENE BLEND: Brand: GAMMEX

## (undated) DEVICE — SUPER SPONGES,MEDIUM: Brand: KERLIX

## (undated) DEVICE — KIT ASCP FX PUSHLOCK LOPRFL

## (undated) DEVICE — PREMIUM WET SKIN PREP TRAY: Brand: MEDLINE INDUSTRIES, INC.

## (undated) DEVICE — VAPR S 90 ELECTRODE 40 MM 90 DEGREES SUCTION WITH INTEGRATED HANDPIECE: Brand: VAPR

## (undated) DEVICE — [AUGER BUR, ARTHROSCOPIC SHAVER BLADE,  DO NOT RESTERILIZE,  DO NOT USE IF PACKAGE IS DAMAGED,  KEEP DRY,  KEEP AWAY FROM SUNLIGHT]: Brand: FORMULA

## (undated) DEVICE — ABDOMINAL PAD: Brand: DERMACEA

## (undated) DEVICE — 3M™ COBAN™ NL STERILE NON-LATEX SELF-ADHERENT WRAP, 2084S, 4 IN X 5 YD (10 CM X 4,5 M), 18 ROLLS/CASE: Brand: 3M™ COBAN™

## (undated) DEVICE — SHOULDER ARTHROSCOPY CDS-LF: Brand: MEDLINE INDUSTRIES, INC.

## (undated) DEVICE — CANNULA 5.75 CLEAR AR-6560

## (undated) DEVICE — BIPOLAR SEALER 23-112-1 AQM 6.0: Brand: AQUAMANTYS™

## (undated) DEVICE — 3M™ STERI-STRIP™ REINFORCED ADHESIVE SKIN CLOSURES, R1547, 1/2 IN X 4 IN (12 MM X 100 MM), 6 STRIPS/ENVELOPE: Brand: 3M™ STERI-STRIP™

## (undated) DEVICE — [AGGRESSIVE PLUS CUTTER, ARTHROSCOPIC SHAVER BLADE,  DO NOT RESTERILIZE,  DO NOT USE IF PACKAGE IS DAMAGED,  KEEP DRY,  KEEP AWAY FROM SUNLIGHT]: Brand: FORMULA

## (undated) DEVICE — SHOULDER TRACTION KIT AR-1635

## (undated) DEVICE — CANNULA PASSPORT AR-6592-08-40

## (undated) DEVICE — APPLICATOR PREP 26ML CHG 2% ISO ALC 70%

## (undated) DEVICE — Device: Brand: STABLECUT®

## (undated) DEVICE — SUT COAT VCRL+ 1 18IN CTX ABSRB VLT ANTIBACT

## (undated) DEVICE — SLINGSHOT 2 L

## (undated) DEVICE — SHEET,DRAPE,53X77,STERILE: Brand: MEDLINE

## (undated) DEVICE — 2T11 #2 PDO 36 X 36: Brand: 2T11 #2 PDO 36 X 36

## (undated) DEVICE — SOLUTION IV 1000ML 0.9% NACL PRESERVATIVE

## (undated) DEVICE — ADHESIVE LIQ 2/3ML VI MASTISOL

## (undated) DEVICE — GAMMEX® PI HYBRID SIZE 9, STERILE POWDER-FREE SURGICAL GLOVE, POLYISOPRENE AND NEOPRENE BLEND: Brand: GAMMEX

## (undated) DEVICE — SUCTION CANISTER, 3000CC,SAFELINER: Brand: DEROYAL

## (undated) DEVICE — SOL LACT RINGERS 3000ML

## (undated) DEVICE — GAMMEX® NON-LATEX PI ORTHO SIZE 9, STERILE POLYISOPRENE POWDER-FREE SURGICAL GLOVE: Brand: GAMMEX

## (undated) DEVICE — STERILE POLYISOPRENE POWDER-FREE SURGICAL GLOVES: Brand: PROTEXIS

## (undated) DEVICE — 2DE14 2-0 PDO 24 X 24: Brand: 2DE14 2-0 PDO 24 X 24

## (undated) DEVICE — 450 ML BOTTLE OF 0.05% CHLORHEXIDINE GLUCONATE IN 99.95% STERILE WATER FOR IRRIGATION, USP AND APPLICATOR.: Brand: IRRISEPT ANTIMICROBIAL WOUND LAVAGE

## (undated) DEVICE — MINI VERSALIGHT W/ EXT FRAZ TIP 4.5MM

## (undated) DEVICE — POLAR CARE CUBE COOLING UNIT

## (undated) DEVICE — KIT INCIS MGMT PREVENA DRAIN 13CM PEEL AND PLC DISP

## (undated) DEVICE — HOOD: Brand: FLYTE

## (undated) DEVICE — ANTIBACTERIAL UNDYED BRAIDED (POLYGLACTIN 910), SYNTHETIC ABSORBABLE SUTURE: Brand: COATED VICRYL

## (undated) NOTE — LETTER
[facility]  Jamie Quintero 60 [id number] [facility], [address] [place] [address]    Consent for Operation    Date: __________________    Time: _______________    1.  I authorize the performance upon Lora Anguiano the following operation:    Procedure(s):  ARTHROSCOPIC ACROMIOP procedure has been videotaped, the surgeon will obtain the original videotape. The hospital will not be responsible for storage or maintenance of this tape.     6. For the purpose of advancing medical education, I consent to the admittance of observers to t STATEMENTS REQUIRING INSERTION OR COMPLETION WERE FILLED IN.     Signature of Patient:   ___________________________    When the patient is a minor or mentally incompetent to give consent:  Signature of person authorized to consent for patient: ____________ drugs/illegal medications). Failure to inform my anesthesiologist about these medicines may increase my risk of anesthetic complications. · If I am allergic to anything or have had a reaction to anesthesia before.     3. I understand how the anesthesia med I have discussed the procedure and information above with the patient (or patient’s representative) and answered their questions. The patient or their representative has agreed to have anesthesia services.     _______________________________________________